# Patient Record
Sex: MALE | Race: OTHER | Employment: UNEMPLOYED | URBAN - METROPOLITAN AREA
[De-identification: names, ages, dates, MRNs, and addresses within clinical notes are randomized per-mention and may not be internally consistent; named-entity substitution may affect disease eponyms.]

---

## 2018-04-13 ENCOUNTER — APPOINTMENT (OUTPATIENT)
Dept: GENERAL RADIOLOGY | Age: 23
End: 2018-04-13
Attending: EMERGENCY MEDICINE
Payer: SELF-PAY

## 2018-04-13 ENCOUNTER — HOSPITAL ENCOUNTER (EMERGENCY)
Age: 23
Discharge: HOME | End: 2018-04-13
Attending: EMERGENCY MEDICINE
Payer: SELF-PAY

## 2018-04-13 VITALS
BODY MASS INDEX: 32.65 KG/M2 | OXYGEN SATURATION: 100 % | TEMPERATURE: 98.3 F | HEART RATE: 70 BPM | SYSTOLIC BLOOD PRESSURE: 139 MMHG | HEIGHT: 69 IN | DIASTOLIC BLOOD PRESSURE: 96 MMHG | WEIGHT: 220.46 LBS | RESPIRATION RATE: 16 BRPM

## 2018-04-13 DIAGNOSIS — S62.302A CLOSED NONDISPLACED FRACTURE OF THIRD METACARPAL BONE OF RIGHT HAND, UNSPECIFIED PORTION OF METACARPAL, INITIAL ENCOUNTER: Primary | ICD-10-CM

## 2018-04-13 DIAGNOSIS — S62.390A CLOSED NONDISPLACED FRACTURE OF OTHER PART OF SECOND METACARPAL BONE OF RIGHT HAND, INITIAL ENCOUNTER: ICD-10-CM

## 2018-04-13 PROCEDURE — 74011250637 HC RX REV CODE- 250/637: Performed by: EMERGENCY MEDICINE

## 2018-04-13 PROCEDURE — 73130 X-RAY EXAM OF HAND: CPT

## 2018-04-13 PROCEDURE — 99283 EMERGENCY DEPT VISIT LOW MDM: CPT

## 2018-04-13 RX ORDER — ACETAMINOPHEN 325 MG/1
650 TABLET ORAL
Status: COMPLETED | OUTPATIENT
Start: 2018-04-13 | End: 2018-04-13

## 2018-04-13 RX ORDER — IBUPROFEN 800 MG/1
800 TABLET ORAL
Qty: 20 TAB | Refills: 0 | Status: SHIPPED | OUTPATIENT
Start: 2018-04-13 | End: 2018-04-20

## 2018-04-13 RX ADMIN — ACETAMINOPHEN 650 MG: 325 TABLET, FILM COATED ORAL at 12:10

## 2018-04-13 NOTE — DISCHARGE INSTRUCTIONS
Fractura de mano: Instrucciones de cuidado - [ Broken Hand: Care Instructions ]  Instrucciones de cuidado  La mano puede romperse (fracturarse) mientras practica deportes, cuando se  o tiene otros accidentes. Usted puede fracturarse la mano al [facility], [place] o [place] para protegerse de cullen caída. Las fracturas pueden variar desde cullen [place] y [address] , [facility] un hueso o varios huesos rotos en dos o [place]. El tratamiento depende de la gravedad de la fractura. Es posible que copeland médico le haya colocado en la mano un aparato ortopédico, cullen tablilla (férula) o un yeso para que pueda sanar o para mantenerla estable hasta que judah a otro médico. La mano podría tardar semanas o meses en sanar. Con algunos cuidados en el hogar puede ayudar a que sane. Usted sanará mejor si cuida kimberlee de sí mismo. Coma cullen variedad de alimentos saludables y no fume. La atención de seguimiento es cullen parte clave de copeland tratamiento y seguridad. Asegúrese de hacer y acudir a todas las citas, y llame a copeland médico si está teniendo problemas. También es cullen buena idea saber los resultados de los exámenes y mantener cullen lista de los medicamentos que corrine. ¿Cómo puede cuidarse en el hogar? · Colóquese hielo o cullen compresa fría en la mano clint 10 a 20 minutos cada vez. Trate de hacerlo cada 1 a 2 horas clint los siguientes 3 días (cuando esté despierto). Póngase un paño wick entre el hielo y el yeso o la tablilla. Mantenga seco el yeso o la tablilla. · Siga las indicaciones que le haya dado copeland médico para el cuidado de copeland yeso. Si tiene cullen tablilla, no se la quite a menos que copeland médico se lo indique. · Sea michael con los medicamentos. [place] los analgésicos (medicamentos para el dolor) exactamente según las indicaciones. ¨ Si el médico le recetó un analgésico, tómelo según las indicaciones.   ¨ Si no está tomando un analgésico recetado, pregúntele a copeland médico si puede shania un medicamento de venta pablo.  · [facility] la mano sobre almohadas cuando esté sentado o acostado clint los primeros días siguientes a la lesión. Mantenga la mano por encima del nivel del corazón. [place] ayudará a reducir la hinchazón. · Siga las indicaciones sobre ejercicios para mantener el [place]. · Mueva los dedos sanos con frecuencia para reducir la hinchazón y la rigidez, seng no use la mano lesionada para agarrar ni cargar nada. ¿Cuándo debe pedir ayuda? Llame a copeland médico ahora mismo o busque atención médica inmediata si:  ? · El dolor aumenta o es muy intenso. ? · El yeso o la tablilla siente demasiado apretado. ? · No puede  los dedos. ? · Siente hormigueo, debilitamiento o entumecimiento en la mano y los dedos. ? · La mano o los dedos están fríos o pálidos o [place] de color. ? · Tiene mucha hinchazón cerca del yeso o la tablilla. ? · Siente ardor o picazón en la piel cubierta por el yeso o la tablilla. ?Preste especial atención a los cambios en copeland mac y asegúrese de comunicarse con copeland médico si:  ? · No mejora juan c se esperaba. ¿Dónde puede encontrar más información en inglés? [place] a http://osvaldo-marv.info/. Calvin Han H951 en la búsqueda para aprender más acerca de \"Fractura de mano: Instrucciones de cuidado - [ Broken Hand: Care Instructions ]. \"  Revisado: 21 marzo, 2017  Versión del contenido: 11.4  © 9832-1818 [employer]. Las instrucciones de cuidado fueron adaptadas bajo licencia por [employer] (which disclaims liability or warranty for this information). Si usted tiene [facility] afección médica o sobre estas instrucciones, siempre pregunte a copeland profesional de mac. [facility] niega toda garantía o responsabilidad por copeland uso de esta información.
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
